# Patient Record
Sex: FEMALE | Race: WHITE | NOT HISPANIC OR LATINO | URBAN - NONMETROPOLITAN AREA
[De-identification: names, ages, dates, MRNs, and addresses within clinical notes are randomized per-mention and may not be internally consistent; named-entity substitution may affect disease eponyms.]

---

## 2019-04-12 ENCOUNTER — IMPORTED ENCOUNTER (OUTPATIENT)
Dept: URBAN - NONMETROPOLITAN AREA CLINIC 1 | Facility: CLINIC | Age: 64
End: 2019-04-12

## 2019-04-12 PROBLEM — H25.13: Noted: 2019-04-12

## 2019-04-12 PROBLEM — H52.4: Noted: 2019-04-12

## 2019-04-12 PROBLEM — H04.123: Noted: 2019-04-12

## 2019-04-12 PROBLEM — H52.02: Noted: 2019-04-12

## 2019-04-12 PROBLEM — H52.222: Noted: 2019-04-12

## 2019-04-12 PROBLEM — Q12.0: Noted: 2019-04-12

## 2019-04-12 PROBLEM — H43.813: Noted: 2019-04-12

## 2019-04-12 PROCEDURE — S0621 ROUTINE OPHTHALMOLOGICAL EXA: HCPCS

## 2019-04-12 NOTE — PATIENT DISCUSSION
Cataracts OU-  discussed findings w/patient-  no treatment indicated at this time-  UV protection recommended -  Continue to monitor yearly or prnDES (Dry Eye Syndrome):-  Educated patient on condition/discussed diagnosis in detail with the patient-  Signs/symptoms related to condition discussed in detail.   -  Continue AT's PRN OU                                      -  Continue to monitor or return prn. Vitreous Degeneration OU-  discussed findings with patient. -  no treatment indicated at this time. -  continue to monitor. Presbyopia OU-  discussed findings w/patient-  new spectacle Rx issued-  continue to monitor yearly or prn; 's Notes: BERNARD

## 2022-04-09 ASSESSMENT — TONOMETRY
OS_IOP_MMHG: 14
OD_IOP_MMHG: 14

## 2022-04-09 ASSESSMENT — VISUAL ACUITY
OS_CC: 20/20
OD_CC: 20/20